# Patient Record
Sex: MALE | URBAN - METROPOLITAN AREA
[De-identification: names, ages, dates, MRNs, and addresses within clinical notes are randomized per-mention and may not be internally consistent; named-entity substitution may affect disease eponyms.]

---

## 2020-10-02 ENCOUNTER — HOSPITAL ENCOUNTER (OUTPATIENT)
Age: 65
Discharge: HOME | End: 2020-10-04

## 2020-10-02 PROCEDURE — 88305 TISSUE EXAM BY PATHOLOGIST: CPT

## 2022-09-27 ENCOUNTER — OFFICE VISIT (OUTPATIENT)
Dept: CARDIOLOGY CLINIC | Age: 67
End: 2022-09-27
Payer: MEDICARE

## 2022-09-27 VITALS
WEIGHT: 207 LBS | SYSTOLIC BLOOD PRESSURE: 136 MMHG | BODY MASS INDEX: 28.98 KG/M2 | DIASTOLIC BLOOD PRESSURE: 78 MMHG | HEIGHT: 71 IN | RESPIRATION RATE: 20 BRPM | HEART RATE: 67 BPM

## 2022-09-27 DIAGNOSIS — Z01.818 PRE-OP EXAMINATION: Primary | ICD-10-CM

## 2022-09-27 DIAGNOSIS — R94.31 ABNORMAL EKG: ICD-10-CM

## 2022-09-27 PROBLEM — N13.5 RETROPERITONEAL FIBROSIS: Status: ACTIVE | Noted: 2022-09-27

## 2022-09-27 PROBLEM — K68.2 RETROPERITONEAL FIBROSIS: Status: ACTIVE | Noted: 2022-09-27

## 2022-09-27 PROCEDURE — 4004F PT TOBACCO SCREEN RCVD TLK: CPT | Performed by: INTERNAL MEDICINE

## 2022-09-27 PROCEDURE — 99203 OFFICE O/P NEW LOW 30 MIN: CPT | Performed by: INTERNAL MEDICINE

## 2022-09-27 PROCEDURE — 93000 ELECTROCARDIOGRAM COMPLETE: CPT | Performed by: INTERNAL MEDICINE

## 2022-09-27 PROCEDURE — G8427 DOCREV CUR MEDS BY ELIG CLIN: HCPCS | Performed by: INTERNAL MEDICINE

## 2022-09-27 PROCEDURE — 3017F COLORECTAL CA SCREEN DOC REV: CPT | Performed by: INTERNAL MEDICINE

## 2022-09-27 PROCEDURE — G8419 CALC BMI OUT NRM PARAM NOF/U: HCPCS | Performed by: INTERNAL MEDICINE

## 2022-09-27 PROCEDURE — 1123F ACP DISCUSS/DSCN MKR DOCD: CPT | Performed by: INTERNAL MEDICINE

## 2022-09-27 RX ORDER — ACETAMINOPHEN 160 MG
TABLET,DISINTEGRATING ORAL
COMMUNITY

## 2022-09-27 RX ORDER — MYCOPHENOLATE MOFETIL 500 MG/1
1000 TABLET ORAL 2 TIMES DAILY
COMMUNITY

## 2022-09-27 RX ORDER — ROSUVASTATIN CALCIUM 20 MG/1
20 TABLET, COATED ORAL DAILY
COMMUNITY

## 2022-09-27 ASSESSMENT — ENCOUNTER SYMPTOMS
DIARRHEA: 0
SHORTNESS OF BREATH: 1
VOMITING: 0
COUGH: 0
BACK PAIN: 0
BLOOD IN STOOL: 0
ABDOMINAL PAIN: 0
NAUSEA: 0
WHEEZING: 0
CONSTIPATION: 0

## 2022-09-27 NOTE — PROGRESS NOTES
OUTPATIENT CARDIOLOGY CONSULT    Name: Tasha Matthew    Age: 74 y.o. Date of Service: 9/27/2022    Reason for Consultation:   Chief Complaint   Patient presents with    Cardiac Clearance     NP consult per Rowena SHAIKH for left knee        Referring Physician: Ti Bustillos DO    History of Present Illness:  22-year-old chronic smoker male who is referred for cardiac clearance prior to left knee replacement. He has history of hyperlipidemia and retroperitoneal fibrosis. Patient cannot walk far due to left knee pain. He has been able to do his yard work. He states that he can go up 15 steps with no chest discomfort but feels little dyspnea on exertion. He denies palpitations, dizziness or syncope. He denies orthopnea, PND or lower extremity edema. He has no significant from history for CAD. EG done today revealed sinus rhythm at 67 bpm with borderline first-degree block, left atrial enlargement, diffuse low voltage and anteroseptal myocardial infarction of unknown age. Review of Systems:  Review of Systems   Constitutional:  Negative for chills, fatigue and fever. HENT:  Negative for nosebleeds. Respiratory:  Positive for shortness of breath. Negative for cough and wheezing. He snores at night but denies gasping for air. Gastrointestinal:  Negative for abdominal pain, blood in stool, constipation, diarrhea, nausea and vomiting. Genitourinary:  Negative for dysuria and hematuria. Musculoskeletal:  Negative for back pain, joint swelling and myalgias. Left knee pain. Neurological:  Negative for syncope and light-headedness. Psychiatric/Behavioral:  The patient is not nervous/anxious.          Past Medical History:  Past Medical History:   Diagnosis Date    Hyperlipidemia     Retroperitoneal fibrosis        Past Surgical History:  Past Surgical History:   Procedure Laterality Date    COLONOSCOPY      DENTAL SURGERY      FOOT SURGERY Bilateral        Family History:  No family history on file. Social History:  Social History     Socioeconomic History    Marital status: Unknown     Spouse name: Not on file    Number of children: Not on file    Years of education: Not on file    Highest education level: Not on file   Occupational History    Not on file   Tobacco Use    Smoking status: Every Day     Packs/day: 1.00     Types: Cigarettes     Start date: 1/1/1970    Smokeless tobacco: Never   Substance and Sexual Activity    Alcohol use: Yes     Comment: daily    Drug use: Never    Sexual activity: Not on file   Other Topics Concern    Not on file   Social History Narrative    Not on file     Social Determinants of Health     Financial Resource Strain: Not on file   Food Insecurity: Not on file   Transportation Needs: Not on file   Physical Activity: Not on file   Stress: Not on file   Social Connections: Not on file   Intimate Partner Violence: Not on file   Housing Stability: Not on file       Allergies: Allergies   Allergen Reactions    Sulfa Antibiotics        Current Medications:  Current Outpatient Medications   Medication Sig Dispense Refill    mycophenolate (CELLCEPT) 500 MG tablet Take 1,000 mg by mouth 2 times daily      rosuvastatin (CRESTOR) 20 MG tablet Take 20 mg by mouth daily      Cholecalciferol (VITAMIN D3) 50 MCG (2000 UT) CAPS Take by mouth       No current facility-administered medications for this visit. Physical Exam:  /78   Pulse 67   Resp 20   Ht 5' 10.5\" (1.791 m)   Wt 207 lb (93.9 kg)   BMI 29.28 kg/m²   Wt Readings from Last 3 Encounters:   09/27/22 207 lb (93.9 kg)     Physical Exam:  /78   Pulse 67   Resp 20   Ht 5' 10.5\" (1.791 m)   Wt 207 lb (93.9 kg)   BMI 29.28 kg/m²   Wt Readings from Last 3 Encounters:   09/27/22 207 lb (93.9 kg)     Physical Exam  Constitutional:       General: He is not in acute distress. Appearance: He is well-developed. HENT:      Head: Normocephalic and atraumatic.    Neck: Vascular: No carotid bruit or JVD. Cardiovascular:      Rate and Rhythm: Normal rate and regular rhythm. Heart sounds: No murmur heard. No friction rub. No gallop. Comments: Distant heart sounds. Pulmonary:      Breath sounds: No wheezing or rales. Comments: Decreased air entry bilaterally with no wheezing or crackles. Chest:      Chest wall: No tenderness. Abdominal:      General: Bowel sounds are normal. There is no distension. Palpations: Abdomen is soft. There is no mass. Tenderness: There is no abdominal tenderness. Comments: No abdominal bruit. Musculoskeletal:      Cervical back: Neck supple. Right lower leg: No edema. Left lower leg: No edema. Skin:     General: Skin is warm and dry. Neurological:      Mental Status: He is alert and oriented to person, place, and time. ASSESSMENT / PLAN:  -Preop clearance: Patient orthopedic surgery carries an intermediate risk for perioperative cardiovascular events. Due to patient EKG suggestive of previous anteroseptal myocardial infarction, patient will be referred to undergo a Lexiscan for risk stratification prior to clearing him for surgery. -CAD: Resting EKG consistent with anteroseptal myocardial infarction of unknown age.  -Hyperlipidemia: On Crestor.  -History of retroperitoneal fibrosis. -Chronic smoking with probable COPD. Will continue patient on his Crestor. Will arrange for the patient to have a Lexiscan to rule out significant myocardial ischemia or prior anteroseptal myocardial infarction. Will arrange for the patient to have an echocardiogram to assess for wall motion abnormalities, rule out valvular heart disease, assess ejection fraction and RV systolic pressure. Patient was advised to quit smoking. Will follow-up at the office in 1 year unless significant abnormality on above cardiovascular testing. Thank you for allowing me to participate in your patient's care.  Please feel

## 2022-10-14 ENCOUNTER — TELEPHONE (OUTPATIENT)
Dept: CARDIOLOGY | Age: 67
End: 2022-10-14

## 2022-10-14 NOTE — TELEPHONE ENCOUNTER
Spoke w/ pt to confirm stress and echo for Monday, 10/17/22 starting at 0700. Instructions given included: nothing to eat/drink after midnight except sips of water, hold all forms of caffeine for 12 hrs prior to test.  All questions answered.

## 2022-10-17 ENCOUNTER — HOSPITAL ENCOUNTER (OUTPATIENT)
Dept: CARDIOLOGY | Age: 67
Discharge: HOME | End: 2022-10-17
Payer: MEDICARE

## 2022-10-17 ENCOUNTER — APPOINTMENT (OUTPATIENT)
Dept: CARDIOLOGY | Age: 67
End: 2022-10-17
Payer: MEDICARE

## 2022-10-17 VITALS
SYSTOLIC BLOOD PRESSURE: 132 MMHG | HEIGHT: 70 IN | BODY MASS INDEX: 29.63 KG/M2 | HEART RATE: 60 BPM | WEIGHT: 207 LBS | DIASTOLIC BLOOD PRESSURE: 80 MMHG

## 2022-10-17 DIAGNOSIS — R94.31 ABNORMAL EKG: ICD-10-CM

## 2022-10-17 DIAGNOSIS — Z01.818 PRE-OP EXAMINATION: ICD-10-CM

## 2022-10-17 PROCEDURE — 93306 TTE W/DOPPLER COMPLETE: CPT

## 2022-10-17 PROCEDURE — A9502 TC99M TETROFOSMIN: HCPCS | Performed by: INTERNAL MEDICINE

## 2022-10-17 PROCEDURE — 6360000002 HC RX W HCPCS: Performed by: INTERNAL MEDICINE

## 2022-10-17 PROCEDURE — 3430000000 HC RX DIAGNOSTIC RADIOPHARMACEUTICAL: Performed by: INTERNAL MEDICINE

## 2022-10-17 PROCEDURE — 93017 CV STRESS TEST TRACING ONLY: CPT

## 2022-10-17 PROCEDURE — 2580000003 HC RX 258: Performed by: INTERNAL MEDICINE

## 2022-10-17 PROCEDURE — 78452 HT MUSCLE IMAGE SPECT MULT: CPT

## 2022-10-17 RX ORDER — SODIUM CHLORIDE 0.9 % (FLUSH) 0.9 %
10 SYRINGE (ML) INJECTION PRN
Status: DISCONTINUED | OUTPATIENT
Start: 2022-10-17 | End: 2022-10-18 | Stop reason: HOSPADM

## 2022-10-17 RX ADMIN — SODIUM CHLORIDE, PRESERVATIVE FREE 10 ML: 5 INJECTION INTRAVENOUS at 09:20

## 2022-10-17 RX ADMIN — TETROFOSMIN 10.2 MILLICURIE: 0.23 INJECTION, POWDER, LYOPHILIZED, FOR SOLUTION INTRAVENOUS at 08:04

## 2022-10-17 RX ADMIN — SODIUM CHLORIDE, PRESERVATIVE FREE 10 ML: 5 INJECTION INTRAVENOUS at 09:21

## 2022-10-17 RX ADMIN — REGADENOSON 0.4 MG: 0.08 INJECTION, SOLUTION INTRAVENOUS at 09:20

## 2022-10-17 RX ADMIN — TETROFOSMIN 34 MILLICURIE: 0.23 INJECTION, POWDER, LYOPHILIZED, FOR SOLUTION INTRAVENOUS at 09:21

## 2022-10-17 RX ADMIN — SODIUM CHLORIDE, PRESERVATIVE FREE 10 ML: 5 INJECTION INTRAVENOUS at 08:04

## 2022-10-17 NOTE — PROCEDURES
[address] [place], . [address], [facility]  [phone]                 Pharmacologic Stress Nuclear Gated SPECT Study    Name: [facility] Account Number: [de-identified]    :  1955          Sex: male         Date of Study:  10/17/2022    Height: 5' 10\" (177.8 cm)         Weight: 207 lb (93.9 kg)     Ordering Provider: Junie Rangel MD          PCP: Jessie Farrell DO      Cardiologist: Junie Rangel MD             Interpreting Physician: Britany Mina MD  _________________________________________________________________________________    Indication:   Preoperative Risk Stratification    Clinical History:   Patient has no known history of coronary artery disease. Resting ECG:    HR 60 bpm  Sinus rhythm with a delayed precordial transition zone    Procedure:   Pharmacologic stress testing was performed with regadenoson 0.4 mg for 15 seconds. The heart rate was 60 at baseline and sudeep to 85 beats during the infusion. The blood pressure at baseline was 132/80 and blood pressure at the end of infusion was 143/73. Blood pressure response was normal during the stress procedure. The patient experienced shortness of breath only  during the infusion. ECG during the infusion did not change. IMAGING: Myocardial perfusion imaging was performed at rest 30-35 minutes following the intravenous injection of 10.2 mCi of (Tc-tetrofosmin) followed by 10 ml of Normal Saline. As per infusion protocol, the patient was injected intravenously with 34 mCi of (Tc-tetrofosmin) followed by 10 ml of Normal Saline. Gated post-stress tomographic imaging was performed 45 minutes after stress. FINDINGS: The overall quality of the study was good. Left ventricular cavity size was noted to be normal.    Rotational analog analysis demonstrated no patient motion or abnormal extracardiac radioactivity.     The gated SPECT stress imaging in the short, vertical long, and horizontal long axis demonstrated normal homogeneous tracer distribution throughout the myocardium both on post regadenoson and resting images. Gated SPECT left ventricular ejection fraction was calculated to be 72%, with normal myocardial thickening and wall motion. Impression:    Electrocardiographically normal regadenoson infusion with a clinically  non-ischemic response  Myocardial perfusion imaging was normal.    Overall left ventricular systolic function was normal without regional wall motion abnormalities. 4. Low risk pharmacologic stress test.    Thank you for sending your patient to this [facility].      Electronically signed by All Mensah MD on 10/17/22 at 11:59 AM EDT

## 2022-10-17 NOTE — DISCHARGE INSTRUCTIONS
[address] [facility]      Instructions to Patients    The following are the instructions for patients who have had a procedure in our office today. Patient name: Netta Be    Radionuclide Activity: 40mCi of 99mTc-Sestamibi    Date Administered: 10/17/2022    Expires: 48 hours after scheduled appointment time      Patient may resume normal activity unless otherwise instructed. Patient may resume medications as normal.  If the need should arise, patient may call [phone] between the hours of 7:00am-3:00pm.  After hours there is at least one physician on-call at all times for those patients needing assistance. Patients may call [phone] and the answering service will direct the patient to one of our physicians for assistance. After the patient's test if they are going to be leaving from an airport in the near future they should take this letter with them to verify the test and radionuclide used for their test.      This letter verifies that the above named bearer received an injection of a radionuclide for medical purpose/usage only.         Electronically signed by Marlyn Collins on 10/17/2022 at 9:16 AM

## 2022-10-19 ENCOUNTER — TELEPHONE (OUTPATIENT)
Dept: CARDIOLOGY CLINIC | Age: 67
End: 2022-10-19

## 2022-10-19 NOTE — TELEPHONE ENCOUNTER
Patient had stress and echo done for pre-op clearance.  Please review and advise on results and if pt is clear for sx

## 2022-10-20 NOTE — TELEPHONE ENCOUNTER
Please inform patient that his echo revealed no significant abnormality and nuclear stress test was normal. He is cleared to undergo his surgery from the cardiac standpoint of view.

## 2022-11-15 ENCOUNTER — HOSPITAL ENCOUNTER (OUTPATIENT)
Age: 67
Discharge: HOME | End: 2022-11-17

## 2022-11-15 LAB
ABO/RH: NORMAL
ANTIBODY SCREEN: NORMAL

## 2022-11-15 PROCEDURE — 87081 CULTURE SCREEN ONLY: CPT

## 2022-11-15 PROCEDURE — 86900 BLOOD TYPING SEROLOGIC ABO: CPT

## 2022-11-15 PROCEDURE — 86901 BLOOD TYPING SEROLOGIC RH(D): CPT

## 2022-11-15 PROCEDURE — 86850 RBC ANTIBODY SCREEN: CPT

## 2022-11-17 LAB — MRSA CULTURE ONLY: NORMAL

## 2022-12-13 ENCOUNTER — HOSPITAL ENCOUNTER (OUTPATIENT)
Age: 67
Discharge: HOME | End: 2022-12-15

## 2022-12-13 LAB
ABO/RH: NORMAL
ANTIBODY SCREEN: NORMAL

## 2022-12-13 PROCEDURE — 86900 BLOOD TYPING SEROLOGIC ABO: CPT

## 2022-12-13 PROCEDURE — 86901 BLOOD TYPING SEROLOGIC RH(D): CPT

## 2022-12-13 PROCEDURE — 86850 RBC ANTIBODY SCREEN: CPT

## 2022-12-14 ENCOUNTER — HOSPITAL ENCOUNTER (OUTPATIENT)
Age: 67
Discharge: HOME | End: 2022-12-16

## 2022-12-14 LAB
ANION GAP SERPL CALCULATED.3IONS-SCNC: 8 MMOL/L (ref 7–16)
BUN BLDV-MCNC: 17 MG/DL (ref 6–23)
CALCIUM SERPL-MCNC: 8 MG/DL (ref 8.6–10.2)
CHLORIDE BLD-SCNC: 101 MMOL/L (ref 98–107)
CO2: 25 MMOL/L (ref 22–29)
CREAT SERPL-MCNC: 0.9 MG/DL (ref 0.7–1.2)
GFR SERPL CREATININE-BSD FRML MDRD: >60 ML/MIN/1.73
GLUCOSE BLD-MCNC: 113 MG/DL (ref 74–99)
HCT VFR BLD CALC: 34.7 % (ref 37–54)
HEMOGLOBIN: 11.3 G/DL (ref 12.5–16.5)
MCH RBC QN AUTO: 31.3 PG (ref 26–35)
MCHC RBC AUTO-ENTMCNC: 32.6 % (ref 32–34.5)
MCV RBC AUTO: 96.1 FL (ref 80–99.9)
PDW BLD-RTO: 11.7 FL (ref 11.5–15)
PLATELET # BLD: 250 E9/L (ref 130–450)
PMV BLD AUTO: 9.9 FL (ref 7–12)
POTASSIUM SERPL-SCNC: 4.5 MMOL/L (ref 3.5–5)
RBC # BLD: 3.61 E12/L (ref 3.8–5.8)
SODIUM BLD-SCNC: 134 MMOL/L (ref 132–146)
WBC # BLD: 8.7 E9/L (ref 4.5–11.5)

## 2022-12-14 PROCEDURE — 85027 COMPLETE CBC AUTOMATED: CPT

## 2022-12-14 PROCEDURE — 80048 BASIC METABOLIC PNL TOTAL CA: CPT

## 2025-05-14 ENCOUNTER — TRANSCRIBE ORDERS (OUTPATIENT)
Dept: ADMINISTRATIVE | Age: 70
End: 2025-05-14

## 2025-05-14 DIAGNOSIS — F17.210 CIGARETTE SMOKER: ICD-10-CM

## 2025-05-14 DIAGNOSIS — Z87.891 PERSONAL HISTORY OF TOBACCO USE, PRESENTING HAZARDS TO HEALTH: Primary | ICD-10-CM

## 2025-06-04 ENCOUNTER — HOSPITAL ENCOUNTER (OUTPATIENT)
Age: 70
Discharge: HOME | End: 2025-06-06

## 2025-06-09 LAB — SURGICAL PATHOLOGY REPORT: NORMAL

## 2025-06-11 ENCOUNTER — HOSPITAL ENCOUNTER (OUTPATIENT)
Dept: CT IMAGING | Age: 70
Discharge: HOME | End: 2025-06-13
Attending: FAMILY MEDICINE
Payer: MEDICARE

## 2025-06-11 DIAGNOSIS — F17.210 CIGARETTE SMOKER: ICD-10-CM

## 2025-06-11 DIAGNOSIS — Z87.891 PERSONAL HISTORY OF TOBACCO USE, PRESENTING HAZARDS TO HEALTH: ICD-10-CM

## 2025-06-11 PROCEDURE — 71271 CT THORAX LUNG CANCER SCR C-: CPT
